# Patient Record
Sex: MALE | Race: ASIAN | Employment: FULL TIME | URBAN - METROPOLITAN AREA
[De-identification: names, ages, dates, MRNs, and addresses within clinical notes are randomized per-mention and may not be internally consistent; named-entity substitution may affect disease eponyms.]

---

## 2021-04-23 ENCOUNTER — LAB ENCOUNTER (OUTPATIENT)
Dept: LAB | Age: 61
End: 2021-04-23
Attending: SPECIALIST
Payer: COMMERCIAL

## 2021-04-23 DIAGNOSIS — M10.9 GOUT, UNSPECIFIED: ICD-10-CM

## 2021-04-23 DIAGNOSIS — Z12.5 SPECIAL SCREENING FOR MALIGNANT NEOPLASM OF PROSTATE: ICD-10-CM

## 2021-04-23 DIAGNOSIS — Z13.29 SCREENING FOR THYROID DISORDER: ICD-10-CM

## 2021-04-23 DIAGNOSIS — Z00.01 ENCOUNTER FOR GENERAL ADULT MEDICAL EXAMINATION WITH ABNORMAL FINDINGS: Primary | ICD-10-CM

## 2021-04-23 DIAGNOSIS — Z13.21 SCREENING FOR MALNUTRITION: ICD-10-CM

## 2021-04-23 DIAGNOSIS — E55.9 VITAMIN D DEFICIENCY: ICD-10-CM

## 2021-04-23 DIAGNOSIS — D64.9 ANEMIA, UNSPECIFIED: ICD-10-CM

## 2021-04-23 DIAGNOSIS — Z12.11 SPECIAL SCREENING FOR MALIGNANT NEOPLASMS, COLON: ICD-10-CM

## 2021-04-23 DIAGNOSIS — Z13.1 SCREENING FOR DIABETES MELLITUS: ICD-10-CM

## 2021-04-23 PROCEDURE — 82607 VITAMIN B-12: CPT

## 2021-04-23 PROCEDURE — 83036 HEMOGLOBIN GLYCOSYLATED A1C: CPT

## 2021-04-23 PROCEDURE — 82746 ASSAY OF FOLIC ACID SERUM: CPT

## 2021-04-23 PROCEDURE — 84439 ASSAY OF FREE THYROXINE: CPT

## 2021-04-23 PROCEDURE — 84153 ASSAY OF PSA TOTAL: CPT

## 2021-04-23 PROCEDURE — 84443 ASSAY THYROID STIM HORMONE: CPT

## 2021-04-23 PROCEDURE — 80061 LIPID PANEL: CPT

## 2021-04-23 PROCEDURE — 82274 ASSAY TEST FOR BLOOD FECAL: CPT

## 2021-04-23 PROCEDURE — 85025 COMPLETE CBC W/AUTO DIFF WBC: CPT

## 2021-04-23 PROCEDURE — 82652 VIT D 1 25-DIHYDROXY: CPT

## 2021-04-23 PROCEDURE — 36415 COLL VENOUS BLD VENIPUNCTURE: CPT

## 2021-04-23 PROCEDURE — 80053 COMPREHEN METABOLIC PANEL: CPT

## 2021-07-07 ENCOUNTER — LAB ENCOUNTER (OUTPATIENT)
Dept: LAB | Age: 61
End: 2021-07-07
Attending: INTERNAL MEDICINE
Payer: COMMERCIAL

## 2021-07-07 DIAGNOSIS — Z01.818 PRE-OP TESTING: ICD-10-CM

## 2021-07-08 LAB — SARS-COV-2 RNA RESP QL NAA+PROBE: NOT DETECTED

## 2021-07-10 PROBLEM — Z86.0100 PERSONAL HISTORY OF COLONIC POLYPS: Status: ACTIVE | Noted: 2021-07-10

## 2021-07-10 PROBLEM — K63.5 POLYP OF COLON: Status: ACTIVE | Noted: 2021-07-10

## 2021-07-10 PROBLEM — Z86.010 PERSONAL HISTORY OF COLONIC POLYPS: Status: ACTIVE | Noted: 2021-07-10

## 2024-11-19 ENCOUNTER — TELEPHONE (OUTPATIENT)
Dept: ORTHOPEDICS CLINIC | Facility: CLINIC | Age: 64
End: 2024-11-19

## 2024-11-19 DIAGNOSIS — M25.572 LEFT ANKLE PAIN, UNSPECIFIED CHRONICITY: Primary | ICD-10-CM

## 2024-11-20 ENCOUNTER — PATIENT MESSAGE (OUTPATIENT)
Dept: ORTHOPEDICS CLINIC | Facility: CLINIC | Age: 64
End: 2024-11-20

## 2024-11-20 ENCOUNTER — HOSPITAL ENCOUNTER (OUTPATIENT)
Dept: GENERAL RADIOLOGY | Age: 64
Discharge: HOME | End: 2024-11-20
Attending: PHYSICIAN ASSISTANT
Payer: COMMERCIAL

## 2024-11-20 ENCOUNTER — OFFICE VISIT (OUTPATIENT)
Dept: ORTHOPEDICS CLINIC | Facility: CLINIC | Age: 64
End: 2024-11-20
Payer: COMMERCIAL

## 2024-11-20 VITALS — HEIGHT: 65 IN | WEIGHT: 188 LBS | BODY MASS INDEX: 31.32 KG/M2

## 2024-11-20 DIAGNOSIS — M25.572 LEFT ANKLE PAIN, UNSPECIFIED CHRONICITY: ICD-10-CM

## 2024-11-20 DIAGNOSIS — M25.572 ACUTE LEFT ANKLE PAIN: Primary | ICD-10-CM

## 2024-11-20 PROCEDURE — 99204 OFFICE O/P NEW MOD 45 MIN: CPT | Performed by: PHYSICIAN ASSISTANT

## 2024-11-20 PROCEDURE — 73610 X-RAY EXAM OF ANKLE: CPT | Performed by: PHYSICIAN ASSISTANT

## 2024-11-20 NOTE — PROGRESS NOTES
\[facility] New Patient Note    CC: Left ankle pain    HPI: This 64 year old male presents today with complaints of left ankle pain.  Onset of symptoms started approximately 1 month ago when he was playing pickle ball.  He states that he twisted the ankle and has had discomfort in the back of the ankle since.  He also notes some weakness with walking on the left side.  He feels that he is unable to walk normally because of the weakness.  His son is an anesthesiologist who gave him a cam boot however he has not worn it.  He has not done anything else for treatment.  He is here for further evaluation.    Past Medical History:    Bloating    Flatulence/gas pain/belching    Irregular bowel habits     Past Surgical History:   Procedure Laterality Date    Colonoscopy       Current Outpatient Medications   Medication Sig Dispense Refill    PEG 3350-KCl-Na Bicarb-NaCl 420 g Oral Recon Soln Take as directed by physician. (Patient not taking: Reported on 7/9/2021) 4000 mL 0     Allergies[1]  History reviewed. No pertinent family history.  Social History     Occupational History    Not on file   Tobacco Use    Smoking status: Never    Smokeless tobacco: Never   Substance and Sexual Activity    Alcohol use: Not on file    Drug use: Not on file    Sexual activity: Not on file        ROS:  Complete review of symptoms was reviewed and is non-contributory to the chief complaint as mentioned above.      Physical Exam: He ambulates with a flat-footed gait on the left side.  He has weakness with toe walking but no pain with heel walking.  While prone, his resting position on the left side is to neutral where the right foot has about 45 degrees of plantarflexion.  Raza test is intact however there is a less of a response in plantarflexion compared to the right side.  He has minimal palpable defect but does have discomfort over the Achilles tendon.  No tenderness at the calcaneus.  No ankle swelling or Achilles swelling.   Sensation is present to light touch.        Imaging: I personally viewed, independently interpreted and radiology report read.  They show:  XR ANKLE (MIN 3 VIEWS), LEFT (CPT=73610)    Result Date: 11/20/2024  CONCLUSION:  No acute fracture or other acute bony process.  LOCATION:  [facility]   Dictated by (CST): Travon Scott MD on 11/20/2024 at 8:44 AM     Finalized by (CST): Travon Scott MD on 11/20/2024 at 8:44 AM              Assessment: Left ankle pain, rule out Achilles tear      Plan: I discussed exam findings with the patient.  I am concerned about a partial injury to his Achilles as there is less of a response on Raza testing and his resting position is significantly different than the right side with less plantarflexion.  He also has weakness and is unable to ambulate with a heel-to-toe gait.  I recommend further imaging with an MRI scan to rule out Achilles injury.  We will hold off on any physical therapy.  He will ambulate in the cam boot to avoid any other stress on the Achilles.  He will contact our office if he is unable to get in to the MRI within the next week or so.  He will follow-up with me once MRI is completed to discuss results and next steps.  He will follow-up sooner with any other questions or concerns in the interim.        Debbie Curtis PA-C  Orthopedic Surgery   [address] [place] [id number]   t: [phone]  f: [phone]           This document was partially prepared using Dragon Medical voice recognition software.  Although every attempt is made to correct errors during dictation, discrepancies may still exist. Please contact me with any questions or clarifications.       [1] Not on File

## 2024-11-21 NOTE — TELEPHONE ENCOUNTER
MRI was faxed over to [facility], advised patient to obtain a copy of images on a disk and a copy of the report

## 2024-11-22 NOTE — TELEPHONE ENCOUNTER
Advised patient Debbie will be reaching out once she has results of the MRI to discuss next steps, no need to drop off disk or report since Debbie has access through website portal

## 2024-11-25 NOTE — TELEPHONE ENCOUNTER
Faxed over patients MRI results over to Dr. Delbert Pugh, advised patient to make an appointment with him for further evaluation. Asked for patients email to send over MRI results